# Patient Record
(demographics unavailable — no encounter records)

---

## 2025-03-13 NOTE — DISCUSSION/SUMMARY
[FreeTextEntry1] : 1. CAD: continue Losartan 100mg daily, Aspirin 81mg daily, and atorvastatin 80mg daily. Exercise nuclear stress testing to evaluate for ischemia. It was normal, 8/20/2021. Patient currently asymptomatic. Goal LDL less than 70.  2. HTN: continue losartan 100mg daily. Goal BP less than 130/80.  3. HLD: continue atorvastatin to 80mg daily. Goal LDL less than 70.  Will attempt to obtain most recent lipid panel from PCP.  Follow up in 6 months. [EKG obtained to assist in diagnosis and management of assessed problem(s)] : EKG obtained to assist in diagnosis and management of assessed problem(s)

## 2025-03-13 NOTE — PHYSICAL EXAM
[Normal] : moves all extremities, no focal deficits, normal speech [de-identified] : No carotid artery bruits auscultated bilaterally

## 2025-03-13 NOTE — CARDIOLOGY SUMMARY
[de-identified] : 3/13/2025, Sinus Bradycardia, non-specific ST changes 8/22/2024, Sinus Bradycardia, normal ECG 8/22/2023, Sinus Bradycardia, normal ECG  [de-identified] : 8/30/2021, Exercise Nuclear Stress Testing: exercised for 8 minutes and 30 seconds utilizing standard [facility] Protocol. No ischemia. [de-identified] : 10/26/2018, CTA of the Coronary Arteries: non-obstructive disease. LAD did have 50% lesion.

## 2025-03-13 NOTE — HISTORY OF PRESENT ILLNESS
[FreeTextEntry1] : Historical Perspective: 67 year old male with non-obstructive CAD, HTN, HLD, VANNA presents for cardiac evaluation. Patient gets occasional chest pain while lying in bed at night. Nothing with exertion. Sharp pain. No radiation. Stable in severity and frequency.  Current Health Status: Patient with no chest pain, SOB, or palpitations. No hospitalizations since seeing me last. Remains compliant with his medications and reports no adverse effects.